# Patient Record
Sex: FEMALE | Race: WHITE | NOT HISPANIC OR LATINO | Employment: UNEMPLOYED | URBAN - METROPOLITAN AREA
[De-identification: names, ages, dates, MRNs, and addresses within clinical notes are randomized per-mention and may not be internally consistent; named-entity substitution may affect disease eponyms.]

---
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
## 2024-08-22 DIAGNOSIS — Z30.45 ENCOUNTER FOR SURVEILLANCE OF TRANSDERMAL PATCH HORMONAL CONTRACEPTIVE DEVICE: ICD-10-CM

## 2024-08-22 RX ORDER — NORELGESTROMIN AND ETHINYL ESTRADIOL 150; 35 UG/D; UG/D
PATCH TRANSDERMAL
Qty: 3 PATCH | Refills: 11 | Status: SHIPPED | OUTPATIENT
Start: 2024-08-22

## 2024-10-23 ENCOUNTER — OFFICE VISIT (OUTPATIENT)
Dept: OBGYN CLINIC | Facility: CLINIC | Age: 19
End: 2024-10-23

## 2024-10-23 VITALS
BODY MASS INDEX: 33.49 KG/M2 | SYSTOLIC BLOOD PRESSURE: 107 MMHG | HEIGHT: 61 IN | HEART RATE: 76 BPM | DIASTOLIC BLOOD PRESSURE: 69 MMHG | WEIGHT: 177.4 LBS

## 2024-10-23 DIAGNOSIS — Z11.3 SCREEN FOR STD (SEXUALLY TRANSMITTED DISEASE): Primary | ICD-10-CM

## 2024-10-23 DIAGNOSIS — Z30.09 GENERAL COUNSELING AND ADVICE ON FEMALE CONTRACEPTION: ICD-10-CM

## 2024-10-23 PROCEDURE — 87591 N.GONORRHOEAE DNA AMP PROB: CPT | Performed by: NURSE PRACTITIONER

## 2024-10-23 PROCEDURE — 87491 CHLMYD TRACH DNA AMP PROBE: CPT | Performed by: NURSE PRACTITIONER

## 2024-10-23 PROCEDURE — 99213 OFFICE O/P EST LOW 20 MIN: CPT | Performed by: NURSE PRACTITIONER

## 2024-10-23 NOTE — PROGRESS NOTES
"Ambulatory Visit  Name: Chencho Calix      : 2005      MRN: [id number]  Encounter Provider: CY Bain  Encounter Date: 10/23/2024   Encounter department: [facility]    Assessment & Plan  Screen for STD (sexually transmitted disease)    Orders:    Chlamydia/GC amplified DNA by PCR    General counseling and advice on female contraception       Plan  Continue birth control patches as previously directed  STD results can take up to 2 weeks  Remember safe sex and condom use  Call with needs or concerns  Return in 1 years for Patch check and STD screen  Pt verbalized understanding of all discussed.      History of Present Illness     Chencho Calix is a 18 y.o. female who presents to continues birth control patches  Pt states she has 1, 7  day period per month  Pt states she is remembering to change her patch weekly as directed  Pt states she is not currently sexually active  Negative GC/Chlamydia 2023  HPV vaccines  and 2017    Safe and effective use of Ortho-Evra reinforced  Reinforced safe sex and condom use     Depression Screening Follow-up Plan: Patient's depression screening was negative with a PHQ-2 score of 0. Their PHQ-9 score was 0. Patient with underlying depression and was advised to continue current medications as prescribed.        Review of Systems  .Pertinent items are note in the HPI          Objective     /69 (BP Location: Left arm, Patient Position: Sitting, Cuff Size: Standard)   Pulse 76   Ht 5' 1\" (1.549 m)   Wt 80.5 kg (177 lb 6.4 oz)   LMP 10/14/2024 (Exact Date)   BMI 33.52 kg/m²     Physical Exam  Vitals reviewed.   Constitutional:       Appearance: Normal appearance.   Eyes:      General:         Right eye: No discharge.         Left eye: No discharge.   Pulmonary:      Effort: Pulmonary effort is normal. No respiratory distress.   Musculoskeletal:         General: Normal range of motion.      Cervical back: Normal range " of motion.   Neurological:      Mental Status: She is alert and oriented to person, place, and time.   Psychiatric:         Mood and Affect: Mood normal.         Behavior: Behavior normal.         Thought Content: Thought content normal.     Negative cough or SOB

## 2024-10-23 NOTE — PATIENT INSTRUCTIONS
Continue birth control patches as previously directed  STD results can take up to 2 weeks  Remember safe sex and condom use  Call with needs or concerns  Return in 1 years for Patch check and STD screen

## 2024-10-24 LAB
C TRACH DNA SPEC QL NAA+PROBE: NEGATIVE
N GONORRHOEA DNA SPEC QL NAA+PROBE: NEGATIVE